# Patient Record
Sex: MALE | Race: WHITE | Employment: FULL TIME | URBAN - METROPOLITAN AREA
[De-identification: names, ages, dates, MRNs, and addresses within clinical notes are randomized per-mention and may not be internally consistent; named-entity substitution may affect disease eponyms.]

---

## 2018-07-27 PROBLEM — M25.311 INSTABILITY OF RIGHT SHOULDER JOINT: Status: ACTIVE | Noted: 2018-07-27

## 2018-07-27 PROBLEM — G89.29 CHRONIC RIGHT SHOULDER PAIN: Status: ACTIVE | Noted: 2018-07-27

## 2018-07-27 PROBLEM — M25.511 CHRONIC RIGHT SHOULDER PAIN: Status: ACTIVE | Noted: 2018-07-27

## 2023-03-17 ENCOUNTER — OFFICE VISIT (OUTPATIENT)
Dept: FAMILY MEDICINE CLINIC | Facility: CLINIC | Age: 33
End: 2023-03-17
Payer: COMMERCIAL

## 2023-03-17 VITALS
HEIGHT: 71 IN | TEMPERATURE: 97 F | HEART RATE: 70 BPM | OXYGEN SATURATION: 97 % | DIASTOLIC BLOOD PRESSURE: 82 MMHG | BODY MASS INDEX: 26.04 KG/M2 | WEIGHT: 186 LBS | SYSTOLIC BLOOD PRESSURE: 122 MMHG | RESPIRATION RATE: 14 BRPM

## 2023-03-17 DIAGNOSIS — Z00.00 WELL ADULT EXAM: Primary | ICD-10-CM

## 2023-03-17 DIAGNOSIS — Z31.41 FERTILITY TESTING: ICD-10-CM

## 2023-03-17 DIAGNOSIS — Z80.42 FAMILY HISTORY OF PROSTATE CANCER IN FATHER: ICD-10-CM

## 2024-08-03 ENCOUNTER — LAB ENCOUNTER (OUTPATIENT)
Dept: LAB | Age: 34
End: 2024-08-03
Attending: STUDENT IN AN ORGANIZED HEALTH CARE EDUCATION/TRAINING PROGRAM
Payer: COMMERCIAL

## 2024-08-03 ENCOUNTER — OFFICE VISIT (OUTPATIENT)
Dept: FAMILY MEDICINE CLINIC | Facility: CLINIC | Age: 34
End: 2024-08-03
Payer: COMMERCIAL

## 2024-08-03 VITALS
DIASTOLIC BLOOD PRESSURE: 60 MMHG | BODY MASS INDEX: 26.09 KG/M2 | SYSTOLIC BLOOD PRESSURE: 112 MMHG | HEIGHT: 71 IN | HEART RATE: 64 BPM | RESPIRATION RATE: 16 BRPM | WEIGHT: 186.38 LBS | OXYGEN SATURATION: 97 % | TEMPERATURE: 97 F

## 2024-08-03 DIAGNOSIS — Z13.31 NEGATIVE DEPRESSION SCREENING: ICD-10-CM

## 2024-08-03 DIAGNOSIS — Z13.220 SCREENING CHOLESTEROL LEVEL: ICD-10-CM

## 2024-08-03 DIAGNOSIS — Z00.00 WELL ADULT EXAM: ICD-10-CM

## 2024-08-03 DIAGNOSIS — Z80.42 FH: PROSTATE CANCER: ICD-10-CM

## 2024-08-03 DIAGNOSIS — Z00.00 WELL ADULT EXAM: Primary | ICD-10-CM

## 2024-08-03 LAB
ALBUMIN SERPL-MCNC: 4.9 G/DL (ref 3.2–4.8)
ALBUMIN/GLOB SERPL: 2 {RATIO} (ref 1–2)
ALP LIVER SERPL-CCNC: 51 U/L
ALT SERPL-CCNC: 24 U/L
ANION GAP SERPL CALC-SCNC: 5 MMOL/L (ref 0–18)
AST SERPL-CCNC: 22 U/L (ref ?–34)
BASOPHILS # BLD AUTO: 0.03 X10(3) UL (ref 0–0.2)
BASOPHILS NFR BLD AUTO: 0.5 %
BILIRUB SERPL-MCNC: 1.1 MG/DL (ref 0.3–1.2)
BUN BLD-MCNC: 19 MG/DL (ref 9–23)
CALCIUM BLD-MCNC: 10.2 MG/DL (ref 8.7–10.4)
CHLORIDE SERPL-SCNC: 105 MMOL/L (ref 98–112)
CHOLEST SERPL-MCNC: 211 MG/DL (ref ?–200)
CO2 SERPL-SCNC: 28 MMOL/L (ref 21–32)
CREAT BLD-MCNC: 0.85 MG/DL
EGFRCR SERPLBLD CKD-EPI 2021: 117 ML/MIN/1.73M2 (ref 60–?)
EOSINOPHIL # BLD AUTO: 0.1 X10(3) UL (ref 0–0.7)
EOSINOPHIL NFR BLD AUTO: 1.6 %
ERYTHROCYTE [DISTWIDTH] IN BLOOD BY AUTOMATED COUNT: 12.8 %
FASTING PATIENT LIPID ANSWER: YES
FASTING STATUS PATIENT QL REPORTED: YES
GLOBULIN PLAS-MCNC: 2.4 G/DL (ref 2–3.5)
GLUCOSE BLD-MCNC: 92 MG/DL (ref 70–99)
HCT VFR BLD AUTO: 44.9 %
HDLC SERPL-MCNC: 61 MG/DL (ref 40–59)
HGB BLD-MCNC: 14.9 G/DL
IMM GRANULOCYTES # BLD AUTO: 0.02 X10(3) UL (ref 0–1)
IMM GRANULOCYTES NFR BLD: 0.3 %
LDLC SERPL CALC-MCNC: 124 MG/DL (ref ?–100)
LYMPHOCYTES # BLD AUTO: 2.16 X10(3) UL (ref 1–4)
LYMPHOCYTES NFR BLD AUTO: 35.2 %
MCH RBC QN AUTO: 29.9 PG (ref 26–34)
MCHC RBC AUTO-ENTMCNC: 33.2 G/DL (ref 31–37)
MCV RBC AUTO: 90.2 FL
MONOCYTES # BLD AUTO: 0.6 X10(3) UL (ref 0.1–1)
MONOCYTES NFR BLD AUTO: 9.8 %
NEUTROPHILS # BLD AUTO: 3.23 X10 (3) UL (ref 1.5–7.7)
NEUTROPHILS # BLD AUTO: 3.23 X10(3) UL (ref 1.5–7.7)
NEUTROPHILS NFR BLD AUTO: 52.6 %
NONHDLC SERPL-MCNC: 150 MG/DL (ref ?–130)
OSMOLALITY SERPL CALC.SUM OF ELEC: 288 MOSM/KG (ref 275–295)
PLATELET # BLD AUTO: 286 10(3)UL (ref 150–450)
POTASSIUM SERPL-SCNC: 4 MMOL/L (ref 3.5–5.1)
PROT SERPL-MCNC: 7.3 G/DL (ref 5.7–8.2)
RBC # BLD AUTO: 4.98 X10(6)UL
SODIUM SERPL-SCNC: 138 MMOL/L (ref 136–145)
TRIGL SERPL-MCNC: 146 MG/DL (ref 30–149)
VLDLC SERPL CALC-MCNC: 26 MG/DL (ref 0–30)
WBC # BLD AUTO: 6.1 X10(3) UL (ref 4–11)

## 2024-08-03 PROCEDURE — 99395 PREV VISIT EST AGE 18-39: CPT | Performed by: STUDENT IN AN ORGANIZED HEALTH CARE EDUCATION/TRAINING PROGRAM

## 2024-08-03 PROCEDURE — 3008F BODY MASS INDEX DOCD: CPT | Performed by: STUDENT IN AN ORGANIZED HEALTH CARE EDUCATION/TRAINING PROGRAM

## 2024-08-03 PROCEDURE — 3074F SYST BP LT 130 MM HG: CPT | Performed by: STUDENT IN AN ORGANIZED HEALTH CARE EDUCATION/TRAINING PROGRAM

## 2024-08-03 PROCEDURE — 80061 LIPID PANEL: CPT

## 2024-08-03 PROCEDURE — 36415 COLL VENOUS BLD VENIPUNCTURE: CPT

## 2024-08-03 PROCEDURE — 85025 COMPLETE CBC W/AUTO DIFF WBC: CPT

## 2024-08-03 PROCEDURE — 80053 COMPREHEN METABOLIC PANEL: CPT

## 2024-08-03 PROCEDURE — 84153 ASSAY OF PSA TOTAL: CPT

## 2024-08-03 PROCEDURE — 84154 ASSAY OF PSA FREE: CPT

## 2024-08-03 PROCEDURE — 3078F DIAST BP <80 MM HG: CPT | Performed by: STUDENT IN AN ORGANIZED HEALTH CARE EDUCATION/TRAINING PROGRAM

## 2024-08-03 NOTE — PROGRESS NOTES
Subjective:      Chief Complaint   Patient presents with    Annual     Would like to get labs including PSA     HISTORY OF PRESENT ILLNESS  HPI  HPI obtained per patient report.  Dusty Wells is a pleasant 33 year old male presenting for his annual physical.     PAST PATIENT HISTORY  History reviewed. No pertinent past medical history.  Past Surgical History:   Procedure Laterality Date    Tonsillectomy         CURRENT MEDICATIONS  No outpatient medications have been marked as taking for the 8/3/24 encounter (Office Visit) with Jacoby Whiting MD.       HEALTH MAINTENANCE  Immunization History   Administered Date(s) Administered    Covid-19 Vaccine Pfizer 30 mcg/0.3 ml 10/28/2021, 11/17/2021    TDAP 06/26/2017    Typhoid,VI Polysacharide IM 05/20/2017       ALLERGIES AND DRUG REACTIONS  Allergies   Allergen Reactions    Clarithromycin      Dizzy, lightheaded, nausea    Emcin Clear DIZZINESS       Family History   Problem Relation Age of Onset    Diabetes Maternal Grandmother     Cancer Paternal Grandmother         leukemia/colon cancer    Cancer Paternal Uncle         Prostate cancer    Cancer Paternal Uncle 45        colon cancer    Heart Disorder Neg     Hypertension Neg     Lipids Neg     Obesity Neg     Psychiatric Neg      Social History     Socioeconomic History    Marital status:    Tobacco Use    Smoking status: Never    Smokeless tobacco: Never   Vaping Use    Vaping status: Never Used   Substance and Sexual Activity    Alcohol use: Yes     Alcohol/week: 0.0 standard drinks of alcohol     Comment: social    Drug use: No    Sexual activity: Not Currently       Review of Systems   All other systems reviewed and are negative.         Objective:      /60   Pulse 64   Temp 97.2 °F (36.2 °C) (Temporal)   Resp 16   Ht 5' 11\" (1.803 m)   Wt 186 lb 6 oz (84.5 kg)   SpO2 97%   BMI 25.99 kg/m²   Body mass index is 25.99 kg/m².    Physical Exam  Vitals reviewed.   Constitutional:       General: He is  not in acute distress.     Appearance: He is not ill-appearing, toxic-appearing or diaphoretic.   HENT:      Head: Normocephalic and atraumatic.   Eyes:      General: No scleral icterus.        Right eye: No discharge.         Left eye: No discharge.      Extraocular Movements: Extraocular movements intact.      Conjunctiva/sclera: Conjunctivae normal.   Cardiovascular:      Rate and Rhythm: Normal rate and regular rhythm.      Heart sounds: Normal heart sounds.   Pulmonary:      Effort: Pulmonary effort is normal.      Breath sounds: Normal breath sounds.   Abdominal:      General: Abdomen is flat. There is no distension.      Palpations: Abdomen is soft. There is no mass.      Tenderness: There is no abdominal tenderness. There is no guarding or rebound.      Hernia: No hernia is present.   Musculoskeletal:      Cervical back: Neck supple.      Right lower leg: No edema.      Left lower leg: No edema.   Neurological:      Mental Status: He is alert and oriented to person, place, and time.   Psychiatric:         Mood and Affect: Mood normal.            Assessment and Plan:      1. Well adult exam (Primary)  -     CBC With Differential With Platelet; Future; Expected date: 08/03/2024  -     Comp Metabolic Panel (14); Future; Expected date: 08/03/2024  -     Lipid Panel; Future; Expected date: 08/03/2024  2. Screening cholesterol level  -     Lipid Panel; Future; Expected date: 08/03/2024  3. FH: prostate cancer  -     PSA, Total and Free; Future; Expected date: 08/03/2024  4. Negative depression screening    No follow-ups on file.    - annual lab orders were provided today  - he requested a screening PSA to be added onto his lab orders due to a family history of prostate cancer (father and uncle on father's side have a history of prostate cancer)  - UTD on immunizations    Patient verbalized understanding of assessment and recommendations. All questions and concerns were addressed.    Electronically signed by Jacoby  MD Johanne

## 2024-08-04 LAB
% FREE PSA: 46.7 %
% FREE PSA: 46.7 %
PROSTATE SPECIFIC AG: 0.9 NG/ML
PROSTATE SPECIFIC AG: 0.9 NG/ML
PSA, FREE: 0.42 NG/ML
PSA, FREE: 0.42 NG/ML

## 2024-08-06 NOTE — PROGRESS NOTES
Rafi Montano,     Your lab results showed mildly elevated cholesterol levels but otherwise looked great. Please increase your intake of fiber, vegetables, and omega-3s, reduce your intake of saturated fats, and exercise regularly to help improve your cholesterol. Your levels can be monitored annually at this time.     Please let me know if you have any questions or concerns.     Dr. Whiting

## 2025-04-08 ENCOUNTER — HOSPITAL ENCOUNTER (EMERGENCY)
Facility: HOSPITAL | Age: 35
Discharge: HOME | End: 2025-04-08
Attending: EMERGENCY MEDICINE
Payer: COMMERCIAL

## 2025-04-08 ENCOUNTER — APPOINTMENT (OUTPATIENT)
Dept: GENERAL RADIOLOGY | Facility: HOSPITAL | Age: 35
End: 2025-04-08
Attending: EMERGENCY MEDICINE
Payer: COMMERCIAL

## 2025-04-08 ENCOUNTER — APPOINTMENT (OUTPATIENT)
Dept: ULTRASOUND IMAGING | Facility: HOSPITAL | Age: 35
End: 2025-04-08
Attending: EMERGENCY MEDICINE
Payer: COMMERCIAL

## 2025-04-08 VITALS
OXYGEN SATURATION: 99 % | HEIGHT: 71 IN | TEMPERATURE: 99 F | BODY MASS INDEX: 25.9 KG/M2 | DIASTOLIC BLOOD PRESSURE: 87 MMHG | SYSTOLIC BLOOD PRESSURE: 136 MMHG | RESPIRATION RATE: 18 BRPM | HEART RATE: 99 BPM | WEIGHT: 185 LBS

## 2025-04-08 DIAGNOSIS — R10.13 EPIGASTRIC PAIN: Primary | ICD-10-CM

## 2025-04-08 LAB
ALBUMIN SERPL-MCNC: 5.2 G/DL (ref 3.2–4.8)
ALBUMIN/GLOB SERPL: 2 {RATIO} (ref 1–2)
ALP LIVER SERPL-CCNC: 60 U/L
ALT SERPL-CCNC: 22 U/L
ANION GAP SERPL CALC-SCNC: 10 MMOL/L (ref 0–18)
AST SERPL-CCNC: 18 U/L (ref ?–34)
ATRIAL RATE: 114 BPM
BASOPHILS # BLD AUTO: 0.02 X10(3) UL (ref 0–0.2)
BASOPHILS NFR BLD AUTO: 0.3 %
BILIRUB SERPL-MCNC: 0.9 MG/DL (ref 0.3–1.2)
BUN BLD-MCNC: 11 MG/DL (ref 9–23)
CALCIUM BLD-MCNC: 10.4 MG/DL (ref 8.7–10.6)
CHLORIDE SERPL-SCNC: 105 MMOL/L (ref 98–112)
CO2 SERPL-SCNC: 26 MMOL/L (ref 21–32)
CREAT BLD-MCNC: 0.86 MG/DL
EGFRCR SERPLBLD CKD-EPI 2021: 116 ML/MIN/1.73M2 (ref 60–?)
EOSINOPHIL # BLD AUTO: 0.01 X10(3) UL (ref 0–0.7)
EOSINOPHIL NFR BLD AUTO: 0.1 %
ERYTHROCYTE [DISTWIDTH] IN BLOOD BY AUTOMATED COUNT: 12.4 %
GLOBULIN PLAS-MCNC: 2.6 G/DL (ref 2–3.5)
GLUCOSE BLD-MCNC: 104 MG/DL (ref 70–99)
HCT VFR BLD AUTO: 46 %
HGB BLD-MCNC: 16.2 G/DL
IMM GRANULOCYTES # BLD AUTO: 0.03 X10(3) UL (ref 0–1)
IMM GRANULOCYTES NFR BLD: 0.4 %
LIPASE SERPL-CCNC: 33 U/L (ref 12–53)
LYMPHOCYTES # BLD AUTO: 0.7 X10(3) UL (ref 1–4)
LYMPHOCYTES NFR BLD AUTO: 9.9 %
MCH RBC QN AUTO: 29.7 PG (ref 26–34)
MCHC RBC AUTO-ENTMCNC: 35.2 G/DL (ref 31–37)
MCV RBC AUTO: 84.2 FL
MONOCYTES # BLD AUTO: 0.77 X10(3) UL (ref 0.1–1)
MONOCYTES NFR BLD AUTO: 10.9 %
NEUTROPHILS # BLD AUTO: 5.51 X10 (3) UL (ref 1.5–7.7)
NEUTROPHILS # BLD AUTO: 5.51 X10(3) UL (ref 1.5–7.7)
NEUTROPHILS NFR BLD AUTO: 78.4 %
OSMOLALITY SERPL CALC.SUM OF ELEC: 292 MOSM/KG (ref 275–295)
P AXIS: 33 DEGREES
P-R INTERVAL: 158 MS
PLATELET # BLD AUTO: 238 10(3)UL (ref 150–450)
POTASSIUM SERPL-SCNC: 3.8 MMOL/L (ref 3.5–5.1)
PROT SERPL-MCNC: 7.8 G/DL (ref 5.7–8.2)
Q-T INTERVAL: 308 MS
QRS DURATION: 82 MS
QTC CALCULATION (BEZET): 424 MS
R AXIS: 24 DEGREES
RBC # BLD AUTO: 5.46 X10(6)UL
SODIUM SERPL-SCNC: 141 MMOL/L (ref 136–145)
T AXIS: 45 DEGREES
TROPONIN I SERPL HS-MCNC: <3 NG/L
VENTRICULAR RATE: 114 BPM
WBC # BLD AUTO: 7 X10(3) UL (ref 4–11)

## 2025-04-08 PROCEDURE — 99285 EMERGENCY DEPT VISIT HI MDM: CPT

## 2025-04-08 PROCEDURE — 76700 US EXAM ABDOM COMPLETE: CPT | Performed by: EMERGENCY MEDICINE

## 2025-04-08 PROCEDURE — 83690 ASSAY OF LIPASE: CPT | Performed by: EMERGENCY MEDICINE

## 2025-04-08 PROCEDURE — 93005 ELECTROCARDIOGRAM TRACING: CPT

## 2025-04-08 PROCEDURE — S0028 INJECTION, FAMOTIDINE, 20 MG: HCPCS | Performed by: EMERGENCY MEDICINE

## 2025-04-08 PROCEDURE — 71045 X-RAY EXAM CHEST 1 VIEW: CPT | Performed by: EMERGENCY MEDICINE

## 2025-04-08 PROCEDURE — 85025 COMPLETE CBC W/AUTO DIFF WBC: CPT

## 2025-04-08 PROCEDURE — 80053 COMPREHEN METABOLIC PANEL: CPT

## 2025-04-08 PROCEDURE — 93010 ELECTROCARDIOGRAM REPORT: CPT

## 2025-04-08 PROCEDURE — 84484 ASSAY OF TROPONIN QUANT: CPT

## 2025-04-08 PROCEDURE — 85025 COMPLETE CBC W/AUTO DIFF WBC: CPT | Performed by: EMERGENCY MEDICINE

## 2025-04-08 PROCEDURE — 80053 COMPREHEN METABOLIC PANEL: CPT | Performed by: EMERGENCY MEDICINE

## 2025-04-08 PROCEDURE — 84484 ASSAY OF TROPONIN QUANT: CPT | Performed by: EMERGENCY MEDICINE

## 2025-04-08 PROCEDURE — 96375 TX/PRO/DX INJ NEW DRUG ADDON: CPT

## 2025-04-08 PROCEDURE — 96374 THER/PROPH/DIAG INJ IV PUSH: CPT

## 2025-04-08 RX ORDER — MORPHINE SULFATE 4 MG/ML
4 INJECTION, SOLUTION INTRAMUSCULAR; INTRAVENOUS ONCE
Status: COMPLETED | OUTPATIENT
Start: 2025-04-08 | End: 2025-04-08

## 2025-04-08 RX ORDER — DICYCLOMINE HCL 20 MG
20 TABLET ORAL ONCE
Status: COMPLETED | OUTPATIENT
Start: 2025-04-08 | End: 2025-04-08

## 2025-04-08 RX ORDER — DICYCLOMINE HCL 20 MG
20 TABLET ORAL 4 TIMES DAILY PRN
Qty: 30 TABLET | Refills: 0 | Status: SHIPPED | OUTPATIENT
Start: 2025-04-08 | End: 2025-05-08

## 2025-04-08 RX ORDER — MAGNESIUM HYDROXIDE/ALUMINUM HYDROXICE/SIMETHICONE 120; 1200; 1200 MG/30ML; MG/30ML; MG/30ML
30 SUSPENSION ORAL ONCE
Status: COMPLETED | OUTPATIENT
Start: 2025-04-08 | End: 2025-04-08

## 2025-04-08 RX ORDER — PANTOPRAZOLE SODIUM 40 MG/1
40 TABLET, DELAYED RELEASE ORAL DAILY
Qty: 30 TABLET | Refills: 0 | Status: SHIPPED | OUTPATIENT
Start: 2025-04-08 | End: 2025-05-08

## 2025-04-08 RX ORDER — FAMOTIDINE 10 MG/ML
20 INJECTION, SOLUTION INTRAVENOUS ONCE
Status: COMPLETED | OUTPATIENT
Start: 2025-04-08 | End: 2025-04-08

## 2025-04-08 NOTE — ED PROVIDER NOTES
Patient Seen in: [facility] Emergency Department      History     Chief Complaint   Patient presents with    Chest Pain Angina    Epigastric Pain     Stated Complaint: Epigastric and chest pain    Subjective:   HPI      Epigastric pain since morning.  Occasionally radiates his umbilicus but does not go to either lower quadrant.  No nausea no vomiting or diarrhea.  Has some chills but no fevers.  No URI symptoms.    Close history of the same.  Denies any surgical history.    Drinks occasionally but not heavily    No NSAID use  Does not smoke    Objective:     Past Medical History:    Hypercholesteremia              Past Surgical History:   Procedure Laterality Date    Tonsillectomy                  Social History     Socioeconomic History    Marital status:    Tobacco Use    Smoking status: Never    Smokeless tobacco: Never   Vaping Use    Vaping status: Never Used   Substance and Sexual Activity    Alcohol use: Yes     Comment: 3-5 nights per week, 1-2 drinks per night    Drug use: No    Sexual activity: Not Currently                  Physical Exam     ED Triage Vitals   BP 04/08/25 1230 144/86   Pulse 04/08/25 1228 110   Resp 04/08/25 1228 18   Temp 04/08/25 1230 98.5 °F (36.9 °C)   Temp src 04/08/25 1230 Oral   SpO2 04/08/25 1228 97 %   O2 Device --        Current Vitals:   Vital Signs  BP: 136/87  Pulse: 99  Resp: 18  Temp: 98.5 °F (36.9 °C)  Temp src: Oral  MAP (mmHg): (!) 112    Oxygen Therapy  SpO2: 99 %        Physical Exam  Physical Exam   Constitutional: Awake, alert, well appearing  Head: Normocephalic and atraumatic.   Eyes: Conjunctivae are normal. Pupils are equal, round, and reactive to light.   Neck: Normal range of motion. No JVD  Cardiovascular: Normal rate, regular rhythm  Pulmonary/Chest: Normal effort.  No accessory muscle use.  No cyanosis.  Abdominal: Soft. Not distended.  Neurological: Pt is alert and oriented to person, place, and time. no cranial nerve deficits. Speech  fluent      Belly clinically benign on multiple exams.  No pain to deep palpation in the right lower quadrant on several exams.        ED Course     Labs Reviewed   COMP METABOLIC PANEL (14) - Abnormal; Notable for the following components:       Result Value    Glucose 104 (*)     Albumin 5.2 (*)     All other components within normal limits   CBC WITH DIFFERENTIAL WITH PLATELET - Abnormal; Notable for the following components:    Lymphocyte Absolute 0.70 (*)     All other components within normal limits   TROPONIN I HIGH SENSITIVITY - Normal   LIPASE - Normal   RAINBOW DRAW LAVENDER   RAINBOW DRAW LIGHT GREEN   RAINBOW DRAW BLUE     EKG    Rate, intervals and axes as noted on EKG Report.  Rate: 114  Rhythm: Sinus Rhythm  Reading: Sinus rhythm no acute ischemia                Labs including CBC CMP troponin lipase all negative.  Labs including CBC CMP troponin lipase all negative.    US ABDOMEN COMPLETE (CPT=76700)    Result Date: 4/8/2025  CONCLUSION:  No acute sonographic findings in the visualized abdomen.   LOCATION:  [id number]    Dictated by (CST): Anand Farley MD on 4/08/2025 at 2:46 PM     Finalized by (CST): Anand Farley MD on 4/08/2025 at 2:46 PM       XR CHEST AP PORTABLE  (CPT=71045)    Result Date: 4/8/2025  CONCLUSION:  See above.   LOCATION:  [id number]      Dictated by (CST): Lalo Barrientos MD on 4/08/2025 at 1:40 PM     Finalized by (CST): Lalo Barrientos MD on 4/08/2025 at 1:40 PM             MDM            Differential diagnoses considered: Surgical emergencies as acute cholecystitis, acute appendicitis, or considered.  Favoring gastritis.    -Patient has no abdominal tenderness in the lower abdomen on multiple exams and deafly no right lower quadrant tenderness.  Regardless due to persistent epigastric discomfort a CT scan was offered/advised to rule out appendicitis though admittedly, my suspicion for this was low.  Patient declined CT scan in favor of close observation at home.   I suggested he return if  his pain migrates to the lower abdomen or if he has any persistent or worsening symptoms.  He understood.    -Ultrasound was reassuring.  Suspect potentially infectious gastritis.  Supportive care at home.  PPI.  Outpatient GI follow-up.      I visualized the radiology studies, my independent interpretation: Acute cholecystitis is not suggested on ultrasound      Shared decision making was done by: patient, myself.          Medical Decision Making      Disposition and Plan     Clinical Impression:  1. Epigastric pain         Disposition:  Discharge  4/8/2025  3:38 pm    Follow-up:  Cornelio Riggs MD  [address] [place] [address]  [phone]    Follow up            Medications Prescribed:  Discharge Medication List as of 4/8/2025  3:50 PM        START taking these medications    Details   pantoprazole 40 MG Oral Tab EC Take 1 tablet (40 mg total) by mouth daily., Normal, Disp-30 tablet, R-0      dicyclomine 20 MG Oral Tab Take 1 tablet (20 mg total) by mouth 4 (four) times daily as needed., Normal, Disp-30 tablet, R-0                 Supplementary Documentation:

## 2025-04-08 NOTE — ED INITIAL ASSESSMENT (HPI)
Patient arrives ambulatory with \"stomach pain near my diaphragm that started last night\". States it got worse last night and he had the chills overnight into today intermittently. Took pepcid without improvement. Pain is exacerbated with eating. Currently rated 5/10.